# Patient Record
Sex: FEMALE | Race: WHITE | NOT HISPANIC OR LATINO | URBAN - METROPOLITAN AREA
[De-identification: names, ages, dates, MRNs, and addresses within clinical notes are randomized per-mention and may not be internally consistent; named-entity substitution may affect disease eponyms.]

---

## 2017-02-01 ENCOUNTER — OUTPATIENT (OUTPATIENT)
Dept: OUTPATIENT SERVICES | Facility: HOSPITAL | Age: 77
LOS: 1 days | Discharge: HOME | End: 2017-02-01

## 2017-02-01 DIAGNOSIS — C50.919 MALIGNANT NEOPLASM OF UNSPECIFIED SITE OF UNSPECIFIED FEMALE BREAST: ICD-10-CM

## 2017-02-03 ENCOUNTER — APPOINTMENT (OUTPATIENT)
Dept: HEMATOLOGY ONCOLOGY | Facility: CLINIC | Age: 77
End: 2017-02-03

## 2017-02-03 VITALS
DIASTOLIC BLOOD PRESSURE: 83 MMHG | WEIGHT: 209.44 LBS | TEMPERATURE: 98.7 F | BODY MASS INDEX: 35.32 KG/M2 | SYSTOLIC BLOOD PRESSURE: 151 MMHG | RESPIRATION RATE: 16 BRPM | HEART RATE: 80 BPM | OXYGEN SATURATION: 98 % | HEIGHT: 64.49 IN

## 2017-02-03 DIAGNOSIS — Z92.3 PERSONAL HISTORY OF IRRADIATION: ICD-10-CM

## 2017-02-03 DIAGNOSIS — C50.919 MALIGNANT NEOPLASM OF UNSPECIFIED SITE OF UNSPECIFIED FEMALE BREAST: ICD-10-CM

## 2017-02-03 DIAGNOSIS — Z87.42 PERSONAL HISTORY OF OTHER DISEASES OF THE FEMALE GENITAL TRACT: ICD-10-CM

## 2017-02-03 DIAGNOSIS — Z87.891 PERSONAL HISTORY OF NICOTINE DEPENDENCE: ICD-10-CM

## 2017-03-02 ENCOUNTER — FORM ENCOUNTER (OUTPATIENT)
Age: 77
End: 2017-03-02

## 2017-03-03 ENCOUNTER — OUTPATIENT (OUTPATIENT)
Dept: OUTPATIENT SERVICES | Facility: HOSPITAL | Age: 77
LOS: 1 days | End: 2017-03-03
Payer: MEDICARE

## 2017-03-03 ENCOUNTER — APPOINTMENT (OUTPATIENT)
Dept: RADIOLOGY | Facility: IMAGING CENTER | Age: 77
End: 2017-03-03

## 2017-03-03 DIAGNOSIS — Z00.8 ENCOUNTER FOR OTHER GENERAL EXAMINATION: ICD-10-CM

## 2017-03-03 PROCEDURE — 77080 DXA BONE DENSITY AXIAL: CPT

## 2019-06-25 ENCOUNTER — APPOINTMENT (OUTPATIENT)
Dept: GYNECOLOGIC ONCOLOGY | Facility: CLINIC | Age: 79
End: 2019-06-25
Payer: MEDICARE

## 2019-06-25 VITALS — DIASTOLIC BLOOD PRESSURE: 81 MMHG | SYSTOLIC BLOOD PRESSURE: 175 MMHG

## 2019-06-25 VITALS
SYSTOLIC BLOOD PRESSURE: 195 MMHG | WEIGHT: 230 LBS | BODY MASS INDEX: 39.27 KG/M2 | HEIGHT: 64 IN | DIASTOLIC BLOOD PRESSURE: 102 MMHG

## 2019-06-25 DIAGNOSIS — E66.9 OBESITY, UNSPECIFIED: ICD-10-CM

## 2019-06-25 DIAGNOSIS — Z63.4 DISAPPEARANCE AND DEATH OF FAMILY MEMBER: ICD-10-CM

## 2019-06-25 PROCEDURE — 99204 OFFICE O/P NEW MOD 45 MIN: CPT

## 2019-06-25 SDOH — SOCIAL STABILITY - SOCIAL INSECURITY: DISSAPEARANCE AND DEATH OF FAMILY MEMBER: Z63.4

## 2019-06-27 ENCOUNTER — OTHER (OUTPATIENT)
Age: 79
End: 2019-06-27

## 2019-07-01 ENCOUNTER — OUTPATIENT (OUTPATIENT)
Dept: OUTPATIENT SERVICES | Facility: HOSPITAL | Age: 79
LOS: 1 days | End: 2019-07-01
Payer: MEDICARE

## 2019-07-01 DIAGNOSIS — R93.89 ABNORMAL FINDINGS ON DIAGNOSTIC IMAGING OF OTHER SPECIFIED BODY STRUCTURES: ICD-10-CM

## 2019-07-01 DIAGNOSIS — N95.0 POSTMENOPAUSAL BLEEDING: ICD-10-CM

## 2019-07-01 DIAGNOSIS — C54.1 MALIGNANT NEOPLASM OF ENDOMETRIUM: ICD-10-CM

## 2019-07-02 ENCOUNTER — RESULT REVIEW (OUTPATIENT)
Age: 79
End: 2019-07-02

## 2019-07-02 PROCEDURE — 88321 CONSLTJ&REPRT SLD PREP ELSWR: CPT

## 2019-07-09 LAB — SURGICAL PATHOLOGY STUDY: SIGNIFICANT CHANGE UP

## 2019-07-29 ENCOUNTER — OUTPATIENT (OUTPATIENT)
Dept: OUTPATIENT SERVICES | Facility: HOSPITAL | Age: 79
LOS: 1 days | End: 2019-07-29

## 2019-07-29 VITALS
HEART RATE: 80 BPM | SYSTOLIC BLOOD PRESSURE: 158 MMHG | WEIGHT: 225.97 LBS | HEIGHT: 64 IN | DIASTOLIC BLOOD PRESSURE: 82 MMHG | RESPIRATION RATE: 14 BRPM | TEMPERATURE: 97 F | OXYGEN SATURATION: 98 %

## 2019-07-29 DIAGNOSIS — C54.1 MALIGNANT NEOPLASM OF ENDOMETRIUM: ICD-10-CM

## 2019-07-29 DIAGNOSIS — R94.31 ABNORMAL ELECTROCARDIOGRAM [ECG] [EKG]: ICD-10-CM

## 2019-07-29 LAB
ANION GAP SERPL CALC-SCNC: 13 MMO/L — SIGNIFICANT CHANGE UP (ref 7–14)
BLD GP AB SCN SERPL QL: NEGATIVE — SIGNIFICANT CHANGE UP
BUN SERPL-MCNC: 17 MG/DL — SIGNIFICANT CHANGE UP (ref 7–23)
CALCIUM SERPL-MCNC: 9.6 MG/DL — SIGNIFICANT CHANGE UP (ref 8.4–10.5)
CHLORIDE SERPL-SCNC: 105 MMOL/L — SIGNIFICANT CHANGE UP (ref 98–107)
CO2 SERPL-SCNC: 25 MMOL/L — SIGNIFICANT CHANGE UP (ref 22–31)
CREAT SERPL-MCNC: 0.66 MG/DL — SIGNIFICANT CHANGE UP (ref 0.5–1.3)
GLUCOSE SERPL-MCNC: 110 MG/DL — HIGH (ref 70–99)
HBA1C BLD-MCNC: 5.9 % — HIGH (ref 4–5.6)
HCT VFR BLD CALC: 39.4 % — SIGNIFICANT CHANGE UP (ref 34.5–45)
HGB BLD-MCNC: 12.9 G/DL — SIGNIFICANT CHANGE UP (ref 11.5–15.5)
MCHC RBC-ENTMCNC: 29.9 PG — SIGNIFICANT CHANGE UP (ref 27–34)
MCHC RBC-ENTMCNC: 32.7 % — SIGNIFICANT CHANGE UP (ref 32–36)
MCV RBC AUTO: 91.2 FL — SIGNIFICANT CHANGE UP (ref 80–100)
NRBC # FLD: 0 K/UL — SIGNIFICANT CHANGE UP (ref 0–0)
PLATELET # BLD AUTO: 238 K/UL — SIGNIFICANT CHANGE UP (ref 150–400)
PMV BLD: 9.9 FL — SIGNIFICANT CHANGE UP (ref 7–13)
POTASSIUM SERPL-MCNC: 3.9 MMOL/L — SIGNIFICANT CHANGE UP (ref 3.5–5.3)
POTASSIUM SERPL-SCNC: 3.9 MMOL/L — SIGNIFICANT CHANGE UP (ref 3.5–5.3)
RBC # BLD: 4.32 M/UL — SIGNIFICANT CHANGE UP (ref 3.8–5.2)
RBC # FLD: 13 % — SIGNIFICANT CHANGE UP (ref 10.3–14.5)
RH IG SCN BLD-IMP: POSITIVE — SIGNIFICANT CHANGE UP
SODIUM SERPL-SCNC: 143 MMOL/L — SIGNIFICANT CHANGE UP (ref 135–145)
WBC # BLD: 6.67 K/UL — SIGNIFICANT CHANGE UP (ref 3.8–10.5)
WBC # FLD AUTO: 6.67 K/UL — SIGNIFICANT CHANGE UP (ref 3.8–10.5)

## 2019-07-29 RX ORDER — SODIUM CHLORIDE 9 MG/ML
1000 INJECTION, SOLUTION INTRAVENOUS
Refills: 0 | Status: DISCONTINUED | OUTPATIENT
Start: 2019-08-08 | End: 2019-08-08

## 2019-07-29 NOTE — H&P PST ADULT - NSICDXPASTMEDICALHX_GEN_ALL_CORE_FT
PAST MEDICAL HISTORY:  2006 right breast cancer diagnosed ("stage O") --had surgery, received RT, NO chemo     endometrial "polp" in 2011     Fracture left shoulder in January 2011--no surgery     Hypercholesterolemia--takes over-the-counter medications     in 2011,  "ovarian mass"     Primary malignant neoplasm of endometrium

## 2019-07-29 NOTE — H&P PST ADULT - NEGATIVE BREAST SYMPTOMS
no breast lump L/no breast tenderness L/no breast lump R/no nipple discharge R/no breast tenderness R/no nipple discharge L

## 2019-07-29 NOTE — H&P PST ADULT - NEGATIVE CARDIOVASCULAR SYMPTOMS
no palpitations/no peripheral edema/no dyspnea on exertion/no paroxysmal nocturnal dyspnea/no chest pain

## 2019-07-29 NOTE — H&P PST ADULT - NEGATIVE ENMT SYMPTOMS
no nasal congestion/no hearing difficulty/no ear pain/no tinnitus/no vertigo/no nose bleeds/no dysphagia/no gum bleeding

## 2019-07-29 NOTE — H&P PST ADULT - NSICDXPROBLEM_GEN_ALL_CORE_FT
PROBLEM DIAGNOSES  Problem: Malignant neoplasm of endometrium  Assessment and Plan: Scheduled for Robotic Total Laparoscopic Hysterectomy, Bilateral Salpingo Oophorectomy, [place] Lymph Node Mapping and Staging on 8/8/2019.   Preop instructions given, pt verbalized understanding   GI prophylaxis and Chlorhexidine wash with instructions provided, pt verbalized understanding uisng teach back   Medical clearance requested by surgeon. Copy of MC and comparison EKG requested in PST PROBLEM DIAGNOSES  Problem: Malignant neoplasm of endometrium  Assessment and Plan: Scheduled for Robotic Total Laparoscopic Hysterectomy, Bilateral Salpingo Oophorectomy, [place] Lymph Node Mapping and Staging on 8/8/2019.   Preop instructions given, pt verbalized understanding   GI prophylaxis and Chlorhexidine wash with instructions provided, pt verbalized understanding using teach back   Medical clearance requested by surgeon. Copy of MC and comparison EKG requested in PST

## 2019-07-29 NOTE — H&P PST ADULT - RS GEN PE MLT RESP DETAILS PC
respirations non-labored/no chest wall tenderness/clear to auscultation bilaterally/breath sounds equal/good air movement/airway patent/no wheezes

## 2019-07-29 NOTE — H&P PST ADULT - HISTORY OF PRESENT ILLNESS
77 y/o female presents to PST for preoperative evaluation with dx of malignant neoplasm of endometrium. Pt presented to GYN reporting post menopausal bleeding. In office pelvic sonogram revealed thickened endometrial lining. 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 Scheduled for Robotic Total Laparoscopic Hysterectomy, Bilateral Salpingo Oophorectomy, [place] Lymph Node Mapping and Staging on 8/8/2019. 79 y/o female presents to [facility] for preoperative evaluation with dx of malignant neoplasm of endometrium. Pt presented to GYN reporting post menopausal bleeding. An in office pelvic sonogram revealed thickened endometrial lining. 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 Scheduled for Robotic Total Laparoscopic Hysterectomy, Bilateral Salpingo Oophorectomy, [place] Lymph Node Mapping and Staging on 8/8/2019.

## 2019-08-07 ENCOUNTER — TRANSCRIPTION ENCOUNTER (OUTPATIENT)
Age: 79
End: 2019-08-07

## 2019-08-08 ENCOUNTER — TRANSCRIPTION ENCOUNTER (OUTPATIENT)
Age: 79
End: 2019-08-08

## 2019-08-08 ENCOUNTER — RESULT REVIEW (OUTPATIENT)
Age: 79
End: 2019-08-08

## 2019-08-08 ENCOUNTER — APPOINTMENT (OUTPATIENT)
Dept: GYNECOLOGIC ONCOLOGY | Facility: HOSPITAL | Age: 79
End: 2019-08-08

## 2019-08-08 ENCOUNTER — INPATIENT (INPATIENT)
Facility: HOSPITAL | Age: 79
LOS: 0 days | Discharge: HOME | End: 2019-08-09
Attending: OBSTETRICS & GYNECOLOGY | Admitting: OBSTETRICS & GYNECOLOGY
Payer: MEDICARE

## 2019-08-08 VITALS
WEIGHT: 225.97 LBS | HEIGHT: 64 IN | RESPIRATION RATE: 15 BRPM | TEMPERATURE: 98 F | HEART RATE: 73 BPM | OXYGEN SATURATION: 98 % | DIASTOLIC BLOOD PRESSURE: 80 MMHG | SYSTOLIC BLOOD PRESSURE: 209 MMHG

## 2019-08-08 DIAGNOSIS — C54.1 MALIGNANT NEOPLASM OF ENDOMETRIUM: ICD-10-CM

## 2019-08-08 LAB
ANION GAP SERPL CALC-SCNC: 14 MMO/L — SIGNIFICANT CHANGE UP (ref 7–14)
BASOPHILS # BLD AUTO: 0.02 K/UL — SIGNIFICANT CHANGE UP (ref 0–0.2)
BASOPHILS NFR BLD AUTO: 0.1 % — SIGNIFICANT CHANGE UP (ref 0–2)
BUN SERPL-MCNC: 13 MG/DL — SIGNIFICANT CHANGE UP (ref 7–23)
CALCIUM SERPL-MCNC: 9.2 MG/DL — SIGNIFICANT CHANGE UP (ref 8.4–10.5)
CHLORIDE SERPL-SCNC: 100 MMOL/L — SIGNIFICANT CHANGE UP (ref 98–107)
CO2 SERPL-SCNC: 27 MMOL/L — SIGNIFICANT CHANGE UP (ref 22–31)
CREAT SERPL-MCNC: 0.63 MG/DL — SIGNIFICANT CHANGE UP (ref 0.5–1.3)
EOSINOPHIL # BLD AUTO: 0.01 K/UL — SIGNIFICANT CHANGE UP (ref 0–0.5)
EOSINOPHIL NFR BLD AUTO: 0.1 % — SIGNIFICANT CHANGE UP (ref 0–6)
GLUCOSE BLDC GLUCOMTR-MCNC: 85 MG/DL — SIGNIFICANT CHANGE UP (ref 70–99)
GLUCOSE SERPL-MCNC: 150 MG/DL — HIGH (ref 70–99)
HCT VFR BLD CALC: 41.4 % — SIGNIFICANT CHANGE UP (ref 34.5–45)
HGB BLD-MCNC: 13.4 G/DL — SIGNIFICANT CHANGE UP (ref 11.5–15.5)
IMM GRANULOCYTES NFR BLD AUTO: 0.6 % — SIGNIFICANT CHANGE UP (ref 0–1.5)
LYMPHOCYTES # BLD AUTO: 0.84 K/UL — LOW (ref 1–3.3)
LYMPHOCYTES # BLD AUTO: 5.9 % — LOW (ref 13–44)
MAGNESIUM SERPL-MCNC: 1.9 MG/DL — SIGNIFICANT CHANGE UP (ref 1.6–2.6)
MCHC RBC-ENTMCNC: 29.8 PG — SIGNIFICANT CHANGE UP (ref 27–34)
MCHC RBC-ENTMCNC: 32.4 % — SIGNIFICANT CHANGE UP (ref 32–36)
MCV RBC AUTO: 92 FL — SIGNIFICANT CHANGE UP (ref 80–100)
MONOCYTES # BLD AUTO: 0.24 K/UL — SIGNIFICANT CHANGE UP (ref 0–0.9)
MONOCYTES NFR BLD AUTO: 1.7 % — LOW (ref 2–14)
NEUTROPHILS # BLD AUTO: 12.97 K/UL — HIGH (ref 1.8–7.4)
NEUTROPHILS NFR BLD AUTO: 91.6 % — HIGH (ref 43–77)
NRBC # FLD: 0 K/UL — SIGNIFICANT CHANGE UP (ref 0–0)
PHOSPHATE SERPL-MCNC: 3.3 MG/DL — SIGNIFICANT CHANGE UP (ref 2.5–4.5)
PLATELET # BLD AUTO: 227 K/UL — SIGNIFICANT CHANGE UP (ref 150–400)
PMV BLD: 9.7 FL — SIGNIFICANT CHANGE UP (ref 7–13)
POTASSIUM SERPL-MCNC: 4.1 MMOL/L — SIGNIFICANT CHANGE UP (ref 3.5–5.3)
POTASSIUM SERPL-SCNC: 4.1 MMOL/L — SIGNIFICANT CHANGE UP (ref 3.5–5.3)
RBC # BLD: 4.5 M/UL — SIGNIFICANT CHANGE UP (ref 3.8–5.2)
RBC # FLD: 12.8 % — SIGNIFICANT CHANGE UP (ref 10.3–14.5)
SODIUM SERPL-SCNC: 141 MMOL/L — SIGNIFICANT CHANGE UP (ref 135–145)
WBC # BLD: 14.16 K/UL — HIGH (ref 3.8–10.5)
WBC # FLD AUTO: 14.16 K/UL — HIGH (ref 3.8–10.5)

## 2019-08-08 PROCEDURE — 38900 IO MAP OF SENT LYMPH NODE: CPT | Mod: 50

## 2019-08-08 PROCEDURE — 58571 TLH W/T/O 250 G OR LESS: CPT

## 2019-08-08 PROCEDURE — 88307 TISSUE EXAM BY PATHOLOGIST: CPT | Mod: 26

## 2019-08-08 PROCEDURE — 88309 TISSUE EXAM BY PATHOLOGIST: CPT | Mod: 26

## 2019-08-08 PROCEDURE — 88341 IMHCHEM/IMCYTCHM EA ADD ANTB: CPT | Mod: 26

## 2019-08-08 PROCEDURE — 88331 PATH CONSLTJ SURG 1 BLK 1SPC: CPT | Mod: 26

## 2019-08-08 PROCEDURE — 88112 CYTOPATH CELL ENHANCE TECH: CPT | Mod: 26

## 2019-08-08 PROCEDURE — 88302 TISSUE EXAM BY PATHOLOGIST: CPT | Mod: 26

## 2019-08-08 PROCEDURE — 38570 LAPAROSCOPY LYMPH NODE BIOP: CPT

## 2019-08-08 PROCEDURE — S2900 ROBOTIC SURGICAL SYSTEM: CPT | Mod: NC

## 2019-08-08 PROCEDURE — 88342 IMHCHEM/IMCYTCHM 1ST ANTB: CPT | Mod: 26

## 2019-08-08 RX ORDER — IBUPROFEN 200 MG
1 TABLET ORAL
Qty: 0 | Refills: 0 | DISCHARGE

## 2019-08-08 RX ORDER — METOPROLOL TARTRATE 50 MG
5 TABLET ORAL EVERY 6 HOURS
Refills: 0 | Status: DISCONTINUED | OUTPATIENT
Start: 2019-08-08 | End: 2019-08-08

## 2019-08-08 RX ORDER — HYDROMORPHONE HYDROCHLORIDE 2 MG/ML
0.5 INJECTION INTRAMUSCULAR; INTRAVENOUS; SUBCUTANEOUS
Refills: 0 | Status: DISCONTINUED | OUTPATIENT
Start: 2019-08-08 | End: 2019-08-09

## 2019-08-08 RX ORDER — OXYCODONE HYDROCHLORIDE 5 MG/1
10 TABLET ORAL
Refills: 0 | Status: DISCONTINUED | OUTPATIENT
Start: 2019-08-08 | End: 2019-08-09

## 2019-08-08 RX ORDER — HEPARIN SODIUM 5000 [USP'U]/ML
5000 INJECTION INTRAVENOUS; SUBCUTANEOUS EVERY 8 HOURS
Refills: 0 | Status: DISCONTINUED | OUTPATIENT
Start: 2019-08-08 | End: 2019-08-09

## 2019-08-08 RX ORDER — SODIUM CHLORIDE 9 MG/ML
1000 INJECTION, SOLUTION INTRAVENOUS
Refills: 0 | Status: DISCONTINUED | OUTPATIENT
Start: 2019-08-08 | End: 2019-08-08

## 2019-08-08 RX ORDER — OXYCODONE HYDROCHLORIDE 5 MG/1
1 TABLET ORAL
Qty: 5 | Refills: 0
Start: 2019-08-08

## 2019-08-08 RX ORDER — IBUPROFEN 200 MG
600 TABLET ORAL EVERY 6 HOURS
Refills: 0 | Status: DISCONTINUED | OUTPATIENT
Start: 2019-08-08 | End: 2019-08-09

## 2019-08-08 RX ORDER — ACETAMINOPHEN 500 MG
975 TABLET ORAL
Qty: 0 | Refills: 0 | DISCHARGE

## 2019-08-08 RX ORDER — ACETAMINOPHEN 500 MG
650 TABLET ORAL ONCE
Refills: 0 | Status: COMPLETED | OUTPATIENT
Start: 2019-08-08 | End: 2019-08-08

## 2019-08-08 RX ORDER — OXYCODONE HYDROCHLORIDE 5 MG/1
5 TABLET ORAL
Refills: 0 | Status: DISCONTINUED | OUTPATIENT
Start: 2019-08-08 | End: 2019-08-09

## 2019-08-08 RX ORDER — ACETAMINOPHEN 500 MG
975 TABLET ORAL EVERY 6 HOURS
Refills: 0 | Status: DISCONTINUED | OUTPATIENT
Start: 2019-08-08 | End: 2019-08-09

## 2019-08-08 RX ORDER — METOPROLOL TARTRATE 50 MG
5 TABLET ORAL EVERY 6 HOURS
Refills: 0 | Status: DISCONTINUED | OUTPATIENT
Start: 2019-08-08 | End: 2019-08-09

## 2019-08-08 RX ADMIN — HEPARIN SODIUM 5000 UNIT(S): 5000 INJECTION INTRAVENOUS; SUBCUTANEOUS at 21:45

## 2019-08-08 RX ADMIN — Medication 975 MILLIGRAM(S): at 18:26

## 2019-08-08 RX ADMIN — Medication 975 MILLIGRAM(S): at 19:00

## 2019-08-08 RX ADMIN — Medication 5 MILLIGRAM(S): at 21:45

## 2019-08-08 RX ADMIN — Medication 600 MILLIGRAM(S): at 21:45

## 2019-08-08 NOTE — DISCHARGE NOTE PROVIDER - HOSPITAL COURSE
Patient underwent an uncomplicated RA TLH, RS, LSO, SLNM, umbilical hernia repair for endometrial adenocarcinoma. EBL: 50. Hct 39.4->41.4. POD#0 Pain was well controlled, advanced to regular diet. Meredith was discontinued and patient voided spontaneously. No acute events overnight. Upon discharge on POD#1, the patient is ambulating, voiding spontaneously, tolerating oral intake, pain was well controlled with oral medication, and vital signs were stable. Patient to have close follow up with Dr. Arguello.

## 2019-08-08 NOTE — BRIEF OPERATIVE NOTE - OPERATION/FINDINGS
-Anteverted, mobile approx 5cm uterus. No adnexal masses appreciated on bimanual exam  -Abdominal survey demonstrated grossly normal liver edge, stomach, diaphragm, and appendix  -Grossly normal uterus with multiple, small, fibroids noted. Grossly normal tubes bilaterally. Grossly normal right ovary.

## 2019-08-08 NOTE — PROGRESS NOTE ADULT - SUBJECTIVE AND OBJECTIVE BOX
PA GYN/ONC POST OP NOTE:     Pt seen and examined earlier in PACU, was awake and alert and was restless and not happy that she was waiting too long for her to get up to her room. She was given dose of Tylenol and  for pain and feeling better. Pt denies chest pain , SOB, palpitations, nausea/vomiting. She voided and is tolerating some regular diet. Pt given dose of Lopressor 5mg IVP for elevated B/Ps, which she was refusing to take earlier.     Vital Signs Last 24 Hrs  T(C): 36.6 (08 Aug 2019 21:48), Max: 36.7 (08 Aug 2019 10:50)  T(F): 97.8 (08 Aug 2019 21:48), Max: 98 (08 Aug 2019 10:50)  HR: 94 (08 Aug 2019 21:48) (73 - 94)  BP: 159/94 (08 Aug 2019 21:48) (127/85 - 209/80)  BP(mean): 94 (08 Aug 2019 20:00) (85 - 106)  RR: 18 (08 Aug 2019 21:48) (12 - 20)  SpO2: 96% (08 Aug 2019 21:48) (90% - 100%)    U/O:    I&O's Detail    08 Aug 2019 07:01  -  08 Aug 2019 23:23  --------------------------------------------------------  IN:    lactated ringers.: 500 mL    Oral Fluid: 120 mL  Total IN: 620 mL    OUT:    Indwelling Catheter - Urethral: 400 mL    Voided: 400 mL  Total OUT: 800 mL    Total NET: -180 mL    PHYSICAL EXAM:  CHEST/LUNG: CTA B/L  HEART: S1S2 RRR  ABDOMEN: Soft, appropriate tenderness  INCISION: Scope sites C/D/I  EXTREMITIES: NT B/L, Pt has Venodynes on for DVT ppx     LABS:                        13.4   14.16 )-----------( 227      ( 08 Aug 2019 18:15 )             41.4     08-08    141  |  100  |  13  ----------------------------<  150<H>  4.1   |  27  |  0.63    Ca    9.2      08 Aug 2019 18:15  Phos  3.3     08-08  Mg     1.9     08-08    MEDICATIONS  (STANDING):  acetaminophen   Tablet .. 975 milliGRAM(s) Oral every 6 hours  heparin  Injectable 5000 Unit(s) SubCutaneous every 8 hours  ibuprofen  Tablet. 600 milliGRAM(s) Oral every 6 hours  metoprolol tartrate Injectable 5 milliGRAM(s) IV Push every 6 hours    MEDICATIONS  (PRN):  HYDROmorphone  Injectable 0.5 milliGRAM(s) IV Push every 10 minutes PRN Severe Pain (7 - 10)  oxyCODONE    IR 5 milliGRAM(s) Oral every 3 hours PRN Moderate Pain (4 - 6)  oxyCODONE    IR 10 milliGRAM(s) Oral every 3 hours PRN Severe Pain (7 - 10) PA GYN/ONC POST OP NOTE:     Pt seen and examined earlier in PACU, was awake and alert and was restless and not happy that she was waiting too long for her to get up to her room. She was given dose of Tylenol and Motrin for pain and feeling better. Pt denies chest pain , SOB, palpitations, nausea/vomiting. She voided and is tolerating some regular diet. Pt given dose of Lopressor 5mg IVP for elevated B/Ps, which she was refusing to take earlier.     Vital Signs Last 24 Hrs  T(C): 36.6 (08 Aug 2019 21:48), Max: 36.7 (08 Aug 2019 10:50)  T(F): 97.8 (08 Aug 2019 21:48), Max: 98 (08 Aug 2019 10:50)  HR: 94 (08 Aug 2019 21:48) (73 - 94)  BP: 159/94 (08 Aug 2019 21:48) (127/85 - 209/80)  BP(mean): 94 (08 Aug 2019 20:00) (85 - 106)  RR: 18 (08 Aug 2019 21:48) (12 - 20)  SpO2: 96% (08 Aug 2019 21:48) (90% - 100%)    U/O:    I&O's Detail    08 Aug 2019 07:01  -  08 Aug 2019 23:23  --------------------------------------------------------  IN:    lactated ringers.: 500 mL    Oral Fluid: 120 mL  Total IN: 620 mL    OUT:    Indwelling Catheter - Urethral: 400 mL    Voided: 400 mL  Total OUT: 800 mL    Total NET: -180 mL    PHYSICAL EXAM:  CHEST/LUNG: CTA B/L  HEART: S1S2 RRR  ABDOMEN: Soft, appropriate tenderness  INCISION: Scope sites C/D/I  EXTREMITIES: NT B/L, Pt has Venodynes on for DVT ppx     LABS:                        13.4   14.16 )-----------( 227      ( 08 Aug 2019 18:15 )             41.4     08-08    141  |  100  |  13  ----------------------------<  150<H>  4.1   |  27  |  0.63    Ca    9.2      08 Aug 2019 18:15  Phos  3.3     08-08  Mg     1.9     08-08    MEDICATIONS  (STANDING):  acetaminophen   Tablet .. 975 milliGRAM(s) Oral every 6 hours  heparin  Injectable 5000 Unit(s) SubCutaneous every 8 hours  ibuprofen  Tablet. 600 milliGRAM(s) Oral every 6 hours  metoprolol tartrate Injectable 5 milliGRAM(s) IV Push every 6 hours    MEDICATIONS  (PRN):  HYDROmorphone  Injectable 0.5 milliGRAM(s) IV Push every 10 minutes PRN Severe Pain (7 - 10)  oxyCODONE    IR 5 milliGRAM(s) Oral every 3 hours PRN Moderate Pain (4 - 6)  oxyCODONE    IR 10 milliGRAM(s) Oral every 3 hours PRN Severe Pain (7 - 10)

## 2019-08-08 NOTE — ASU PATIENT PROFILE, ADULT - NSTOBACCONEVERSMOKERY/N_GEN_A
Colonoscopy Discharge Instructions       Denver Maus  [id number]  1951      COLONOSCOPY FINDINGS:  Your colonoscopy showed: 1. One colon polyp which was completely removed. 2. Moderate diverticulosis of the colon. FOLLOW UP RECOMMENDATIONS:   Dr. Elijah Gotti will contact you with your results. Dr. Elijah Gotti will recommend your next colonoscopy after the Pathology results are available. DISCOMFORT:  If you have redness at your IV site- apply warm compress to area; if redness or soreness persist- contact your physician  There may be a slight amount of blood passed from the rectum, more than a teaspoon of bright red blood is not expected - contact your physician  Gaseous discomfort is common- walking, belching will help relieve any gas pains. If discomfort persist- contact your physician    DIET:   High fiber diet. ACTIVITY:  You may resume your normal daily activities, however, it is recommended that you spend the remainder of the day resting - avoiding any strenuous activities. You may not operate a vehicle for 24 hours  You may not engage in an occupation involving machinery or appliances for rest of today  You may not drink alcoholic beverages for at least 24 hours  Avoid making any critical decisions for at least 24 hour    CALL M.D.   ANY SIGN OF:   Increasing pain, nausea, vomiting  Abdominal distension (swelling)  New increased bleeding   Fever or chills  Pain in chest area or shortness of breath      Oh Otoole MD, FACS, FASCRS  Colon and Rectal Surgery  [facility]  Office [phone]  Fax     [phone] SUMMARY from Nurse    The following personal items are in your possession at time of discharge:    Dental Appliances: None  Visual Aid: Glasses                            PATIENT INSTRUCTIONS:    After general anesthesia or intravenous sedation, for 24 hours or while taking prescription Narcotics:  · Limit your activities  · Do not drive and operate hazardous machinery  · Do not make important personal or business decisions  · Do  not drink alcoholic beverages  · If you have not urinated within 8 hours after discharge, please contact your surgeon on call. Report the following to your surgeon:  · Excessive pain, swelling, redness or odor of or around the surgical area  · Temperature over 100.5  · Nausea and vomiting lasting longer than 4 hours or if unable to take medications  · Any signs of decreased circulation or nerve impairment to extremity: change in color, persistent  numbness, tingling, coldness or increase pain  · Any questions        What to do at Home:    These are general instructions for a healthy lifestyle:    No smoking/ No tobacco products/ Avoid exposure to second hand smoke    Surgeon General's Warning:  Quitting smoking now greatly reduces serious risk to your health. Obesity, smoking, and sedentary lifestyle greatly increases your risk for illness    A healthy diet, regular physical exercise & weight monitoring are important for maintaining a healthy lifestyle    You may be retaining fluid if you have a history of heart failure or if you experience any of the following symptoms:  Weight gain of 3 pounds or more overnight or 5 pounds in a week, increased swelling in our hands or feet or shortness of breath while lying flat in bed. Please call your doctor as soon as you notice any of these symptoms; do not wait until your next office visit. Recognize signs and symptoms of STROKE:    F-face looks uneven    A-arms unable to move or move unevenly    S-speech slurred or non-existent    T-time-call 911 as soon as signs and symptoms begin-DO NOT go       Back to bed or wait to see if you get better-TIME IS BRAIN. Warning Signs of HEART ATTACK     Call 911 if you have these symptoms:   Chest discomfort.  Most heart attacks involve discomfort in the center of the chest that lasts more than a few minutes, or that goes away and comes back. It can feel like uncomfortable pressure, squeezing, fullness, or pain.  Discomfort in other areas of the upper body. Symptoms can include pain or discomfort in one or both arms, the back, neck, jaw, or stomach.  Shortness of breath with or without chest discomfort.  Other signs may include breaking out in a cold sweat, nausea, or lightheadedness. Don't wait more than five minutes to call 911 - MINUTES MATTER! Fast action can save your life. Calling 911 is almost always the fastest way to get lifesaving treatment. Emergency Medical Services staff can begin treatment when they arrive -- up to an hour sooner than if someone gets to the hospital by car. The discharge information has been reviewed with the patient. The patient verbalized understanding. Discharge medications reviewed with the patient and appropriate educational materials and side effects teaching were provided. Patient armband removed and given to patient to take home.   Patient was informed of the privacy risks if armband lost or stolen Yes

## 2019-08-08 NOTE — DISCHARGE NOTE PROVIDER - NSDCCPTREATMENT_GEN_ALL_CORE_FT
PRINCIPAL PROCEDURE  Procedure: Hysterectomy, robot-assisted, laparoscopic, w either or both BSO and pelvic lymphadenectomy if indic  Findings and Treatment:

## 2019-08-08 NOTE — DISCHARGE NOTE PROVIDER - CARE PROVIDER_API CALL
Luz Maria Arguello)  Gynecologic Oncology; Obstetrics and Gynecology  [address] [place] [address]  Phone: [phone]  Fax: [phone]  Follow Up Time:

## 2019-08-08 NOTE — PROGRESS NOTE ADULT - ASSESSMENT
A/P: 77Y/O S/P Robotic TLH, RS, SLNM, Umbilical Hernia Repair  Plan  1. Patient encouraged to use Incentive Spirometer  2. Pain management; Tylenol and Motrin ATC, Oxycodone PRN  3. Regular diet as tolerates  4. IVL  5. Lopressor 5mg IVP Q 6 hours  6. Heparin 5000 units subcut Q 8 hours  7. Continuous Pulse ox monitoring A/P: 79Y/O S/P Robotic TLH, RSO, SLNM, Umbilical Hernia Repair  Plan  1. Patient encouraged to use Incentive Spirometer  2. Pain management; Tylenol and Motrin ATC, Oxycodone PRN  3. Regular diet as tolerates  4. IVL  5. Lopressor 5mg IVP Q 6 hours  6. Heparin 5000 units subcut Q 8 hours  7. Continuous Pulse ox monitoring

## 2019-08-08 NOTE — BRIEF OPERATIVE NOTE - NSICDXBRIEFPROCEDURE_GEN_ALL_CORE_FT
PROCEDURES:  Hysterectomy, robot-assisted, laparoscopic, w either or both BSO and pelvic lymphadenectomy if indic 08-Aug-2019 16:12:02  Charlene Flores

## 2019-08-08 NOTE — ASU PATIENT PROFILE, ADULT - PMH
2006 right breast cancer diagnosed ("stage O") --had surgery, received RT, NO chemo    endometrial "polp" in 2011    Fracture left shoulder in January 2011--no surgery    Hypercholesterolemia--takes over-the-counter medications    in 2011,  "ovarian mass"    Primary malignant neoplasm of endometrium

## 2019-08-08 NOTE — ASU PATIENT PROFILE, ADULT - PSH
Facial cosmetic surgery x 3 due to Atrophy of Skin    right breast lumpectomy 10/16/06--"stage O"--received post op RT but No chemo  right breast lumpectomy x2

## 2019-08-08 NOTE — DISCHARGE NOTE PROVIDER - NSDCCPCAREPLAN_GEN_ALL_CORE_FT
PRINCIPAL DISCHARGE DIAGNOSIS  Diagnosis: Endometrial adenocarcinoma  Assessment and Plan of Treatment:

## 2019-08-08 NOTE — BRIEF OPERATIVE NOTE - SPECIMENS
1) peritoneal washings 2) left sentinel pelvic lymph node 3) right sentinel pelvic lymph node 4) uterus, cervix, bilateral tubes and right ovary 5) umbilical hernia sac

## 2019-08-08 NOTE — DISCHARGE NOTE PROVIDER - NSDCFUADDINST_GEN_ALL_CORE_FT
Regular diet. Resume normal activity as tolerated. No heavy lifting, driving, or strenuous activity for 6 weeks. Call your doctor with any signs and symptoms of infection such as fever, chills, nausea or vomiting. Call your doctor with redness or swelling at the incision site, fluid leakage or wound separation. Call your doctor if you're unable to tolerate food or have difficulty urinating. Call your doctor if you have pain that is not relieved by your prescribed medications. Notify your doctor with any other concerns. Follow up with Dr. Arguello on 8/27.

## 2019-08-09 ENCOUNTER — TRANSCRIPTION ENCOUNTER (OUTPATIENT)
Age: 79
End: 2019-08-09

## 2019-08-09 VITALS
TEMPERATURE: 97 F | SYSTOLIC BLOOD PRESSURE: 122 MMHG | RESPIRATION RATE: 17 BRPM | HEART RATE: 72 BPM | OXYGEN SATURATION: 93 % | DIASTOLIC BLOOD PRESSURE: 57 MMHG

## 2019-08-09 DIAGNOSIS — C54.1 MALIGNANT NEOPLASM OF ENDOMETRIUM: ICD-10-CM

## 2019-08-09 PROCEDURE — 71046 X-RAY EXAM CHEST 2 VIEWS: CPT | Mod: 26

## 2019-08-09 RX ORDER — DOCUSATE SODIUM 100 MG
100 CAPSULE ORAL
Refills: 0 | Status: DISCONTINUED | OUTPATIENT
Start: 2019-08-09 | End: 2019-08-09

## 2019-08-09 RX ORDER — BENZOCAINE AND MENTHOL 5; 1 G/100ML; G/100ML
1 LIQUID ORAL EVERY 4 HOURS
Refills: 0 | Status: DISCONTINUED | OUTPATIENT
Start: 2019-08-09 | End: 2019-08-09

## 2019-08-09 RX ADMIN — Medication 975 MILLIGRAM(S): at 01:21

## 2019-08-09 RX ADMIN — HEPARIN SODIUM 5000 UNIT(S): 5000 INJECTION INTRAVENOUS; SUBCUTANEOUS at 05:32

## 2019-08-09 RX ADMIN — Medication 975 MILLIGRAM(S): at 08:53

## 2019-08-09 RX ADMIN — BENZOCAINE AND MENTHOL 1 LOZENGE: 5; 1 LIQUID ORAL at 13:23

## 2019-08-09 RX ADMIN — Medication 975 MILLIGRAM(S): at 02:20

## 2019-08-09 RX ADMIN — Medication 975 MILLIGRAM(S): at 08:23

## 2019-08-09 NOTE — DISCHARGE NOTE NURSING/CASE MANAGEMENT/SOCIAL WORK - NSDCDPATPORTLINK_GEN_ALL_CORE
You can access the [facility] Patient Portal, offered by [facility], by registering with the following website: [email or web address]

## 2019-08-09 NOTE — DISCHARGE NOTE NURSING/CASE MANAGEMENT/SOCIAL WORK - NSDCPNINST_GEN_ALL_CORE
No heavy lifting, keep arms below head. Resume regular diet. Notify provider of fever, chills, nausea/ vomiting.

## 2019-08-09 NOTE — PROGRESS NOTE ADULT - SUBJECTIVE AND OBJECTIVE BOX
R2 Gyn ONC Progress Note POD#1  HD#2    Subjective:   Pt seen and examined at bedside. Patient was given supplemental O2 as she destaurated to 90% overnight, she is not in high 90s and feeling well. Pain well controlled. Patient ambulating. Passing flatus. Tolerating regular diet. Pt denies fever, chills, chest pain, SOB, nausea, vomiting, lightheadedness, dizziness.  Has not had BM.    Objective:  T(F): 97.5 (08-09-19 @ 05:28), Max: 98 (08-08-19 @ 10:50)  HR: 64 (08-09-19 @ 05:28) (64 - 94)  BP: 114/58 (08-09-19 @ 05:28) (114/58 - 209/80)  RR: 16 (08-09-19 @ 05:28) (12 - 20)  SpO2: 98% (08-09-19 @ 05:28) (90% - 100%)  Wt(kg): --  I&O's Summary    08 Aug 2019 07:01  -  09 Aug 2019 07:00  --------------------------------------------------------  IN: 620 mL / OUT: 1000 mL / NET: -380 mL      CAPILLARY BLOOD GLUCOSE      POCT Blood Glucose.: 85 mg/dL (08 Aug 2019 11:29)      MEDICATIONS  (STANDING):  acetaminophen   Tablet .. 975 milliGRAM(s) Oral every 6 hours  heparin  Injectable 5000 Unit(s) SubCutaneous every 8 hours  ibuprofen  Tablet. 600 milliGRAM(s) Oral every 6 hours  metoprolol tartrate Injectable 5 milliGRAM(s) IV Push every 6 hours    MEDICATIONS  (PRN):  oxyCODONE    IR 5 milliGRAM(s) Oral every 3 hours PRN Moderate Pain (4 - 6)  oxyCODONE    IR 10 milliGRAM(s) Oral every 3 hours PRN Severe Pain (7 - 10)      Physical Exam:  Constitutional: NAD, A+O x3. Nasal cannula removed  CV: RR S1S2 no m/r/g  Lungs: CTA b/l, good air flow b/l  Abdomen: soft, softly-distended, appropriately-tender. No guarding, no rebound, normal bowel sounds  Incision: Port site incisions clean, dry, intact with Obsites in place  Extremities: no lower extremity edema or calf tenderness bilaterally; venodynes in place    LABS:             13.4   14.16 )-----------( 227      ( 08-08 @ 18:15 )             41.4       08-08    141    |  100    |  13     ----------------------------<  150<H>  4.1     |  27     |  0.63     Ca    9.2        08 Aug 2019 18:15  Phos  3.3       08-08  Mg     1.9       08-08 R2 Gyn ONC Progress Note POD#1  HD#2    Subjective:   Pt seen and examined at bedside. Patient was given supplemental O2 as she destaurated to 90% overnight, she is now in high 90s and feeling well. Pain well controlled. Patient ambulating. Passing flatus. Tolerating regular diet. Pt denies fever, chills, chest pain, SOB, nausea, vomiting, lightheadedness, dizziness.  Has not had BM.    Objective:  T(F): 97.5 (08-09-19 @ 05:28), Max: 98 (08-08-19 @ 10:50)  HR: 64 (08-09-19 @ 05:28) (64 - 94)  BP: 114/58 (08-09-19 @ 05:28) (114/58 - 209/80)  RR: 16 (08-09-19 @ 05:28) (12 - 20)  SpO2: 98% (08-09-19 @ 05:28) (90% - 100%)  Wt(kg): --  I&O's Summary    08 Aug 2019 07:01  -  09 Aug 2019 07:00  --------------------------------------------------------  IN: 620 mL / OUT: 1000 mL / NET: -380 mL      CAPILLARY BLOOD GLUCOSE      POCT Blood Glucose.: 85 mg/dL (08 Aug 2019 11:29)      MEDICATIONS  (STANDING):  acetaminophen   Tablet .. 975 milliGRAM(s) Oral every 6 hours  heparin  Injectable 5000 Unit(s) SubCutaneous every 8 hours  ibuprofen  Tablet. 600 milliGRAM(s) Oral every 6 hours  metoprolol tartrate Injectable 5 milliGRAM(s) IV Push every 6 hours    MEDICATIONS  (PRN):  oxyCODONE    IR 5 milliGRAM(s) Oral every 3 hours PRN Moderate Pain (4 - 6)  oxyCODONE    IR 10 milliGRAM(s) Oral every 3 hours PRN Severe Pain (7 - 10)      Physical Exam:  Constitutional: NAD, A+O x3. Nasal cannula removed  CV: RR S1S2 no m/r/g  Lungs: CTA b/l, good air flow b/l  Abdomen: soft, softly-distended, appropriately-tender. No guarding, no rebound, normal bowel sounds  Incision: Laparoscopic port incisions clean, dry, intact with opsites in place  Extremities: no lower extremity edema or calf tenderness bilaterally; venodynes in place    LABS:             13.4   14.16 )-----------( 227      ( 08-08 @ 18:15 )             41.4       08-08    141    |  100    |  13     ----------------------------<  150<H>  4.1     |  27     |  0.63     Ca    9.2        08 Aug 2019 18:15  Phos  3.3       08-08  Mg     1.9       08-08 R2 Gyn ONC Progress Note POD#1  HD#2    Subjective:   Pt seen and examined at bedside. Patient was given supplemental O2 as she destaurated to 90% overnight, she is now in high 90s and feeling well. NC removed on rounds. Pain well controlled. Patient ambulating. Passing flatus. Tolerating regular diet. Pt denies fever, chills, chest pain, SOB, nausea, vomiting, lightheadedness, dizziness.  Has not had BM.    Objective:  T(F): 97.5 (08-09-19 @ 05:28), Max: 98 (08-08-19 @ 10:50)  HR: 64 (08-09-19 @ 05:28) (64 - 94)  BP: 114/58 (08-09-19 @ 05:28) (114/58 - 209/80)  RR: 16 (08-09-19 @ 05:28) (12 - 20)  SpO2: 98% (08-09-19 @ 05:28) (90% - 100%)  Wt(kg): --  I&O's Summary    08 Aug 2019 07:01  -  09 Aug 2019 07:00  --------------------------------------------------------  IN: 620 mL / OUT: 1000 mL / NET: -380 mL      CAPILLARY BLOOD GLUCOSE      POCT Blood Glucose.: 85 mg/dL (08 Aug 2019 11:29)      MEDICATIONS  (STANDING):  acetaminophen   Tablet .. 975 milliGRAM(s) Oral every 6 hours  heparin  Injectable 5000 Unit(s) SubCutaneous every 8 hours  ibuprofen  Tablet. 600 milliGRAM(s) Oral every 6 hours  metoprolol tartrate Injectable 5 milliGRAM(s) IV Push every 6 hours    MEDICATIONS  (PRN):  oxyCODONE    IR 5 milliGRAM(s) Oral every 3 hours PRN Moderate Pain (4 - 6)  oxyCODONE    IR 10 milliGRAM(s) Oral every 3 hours PRN Severe Pain (7 - 10)      Physical Exam:  Constitutional: NAD, A+O x3. Nasal cannula removed  CV: RR S1S2 no m/r/g  Lungs: CTA b/l, good air flow b/l  Abdomen: soft, softly-distended, appropriately-tender. No guarding, no rebound, normal bowel sounds  Incision: Laparoscopic port incisions clean, dry, intact with opsites in place  Extremities: no lower extremity edema or calf tenderness bilaterally; venodynes in place    LABS:             13.4   14.16 )-----------( 227      ( 08-08 @ 18:15 )             41.4       08-08    141    |  100    |  13     ----------------------------<  150<H>  4.1     |  27     |  0.63     Ca    9.2        08 Aug 2019 18:15  Phos  3.3       08-08  Mg     1.9       08-08

## 2019-08-09 NOTE — CHART NOTE - NSCHARTNOTEFT_GEN_A_CORE
R2 Gyn Onc Chart Note    Went to bedside to see patient as O2 sat on continuous monitor was at 88%. Patient feels well and denies SOB, CP, dizziness. She just finished eating breakfast. O2 had been removed when rounding on patient prior to breakfast. Patient ambulated to bathroom to void  and denied symptoms when standing. Nasal cannula replaced and O2 sat taz to 95%.     Vital Signs Last 24 Hrs  T(C): 36.4 (09 Aug 2019 05:28), Max: 36.7 (08 Aug 2019 10:50)  T(F): 97.5 (09 Aug 2019 05:28), Max: 98 (08 Aug 2019 10:50)  HR: 64 (09 Aug 2019 05:28) (64 - 94)  BP: 114/58 (09 Aug 2019 05:28) (114/58 - 209/80)  BP(mean): 94 (08 Aug 2019 20:00) (85 - 106)  RR: 16 (09 Aug 2019 05:28) (12 - 20)  SpO2: 98% (09 Aug 2019 05:28) (90% - 100%)    Physical Exam:  Well appearing, NAD, obese  Heart: RR, S1 and S2 nl  Lungs: Fine crackles bl at bases, otherwise CTAB  Abd: Soft, NT, obsites in places c/d/i  Venodynes in place    79 yo POD#1 s/p RA TLH, RS, LSO, SLNM, umbilical hernia repair with hypoxia requiring supplemental oxygen. Asymptomatic. Likely due to atelectasis/restrictive body habitus, r/o acute pulmonary process. Patient expressed understanding and is in agreement with the plan.  -2 view chest x-ray ordered to evaluate desaturation  -patient will not be discharged until clear x-ray read and O2 saturation >92% on RA  -Patient encouraged to ambulate  -otherwise c/w plan from this AM    Patient seen with MAURO Anguiano PGY-4 and d/w DIMAS Ramos PGY-6 Gyn Onc Fellow  MINNIE Noyola PGY-2 R2 Gyn Onc Chart Note    Went to bedside to see patient as O2 sat on continuous monitor was at 88%. Patient feels well and denies SOB, CP, dizziness. She just finished eating breakfast. O2 had been removed when rounding on patient prior to breakfast. Patient ambulated to bathroom to void  and denied symptoms when standing. Nasal cannula replaced and O2 sat taz to 95%.     Vital Signs Last 24 Hrs  T(C): 36.4 (09 Aug 2019 05:28), Max: 36.7 (08 Aug 2019 10:50)  T(F): 97.5 (09 Aug 2019 05:28), Max: 98 (08 Aug 2019 10:50)  HR: 64 (09 Aug 2019 05:28) (64 - 94)  BP: 114/58 (09 Aug 2019 05:28) (114/58 - 209/80)  BP(mean): 94 (08 Aug 2019 20:00) (85 - 106)  RR: 16 (09 Aug 2019 05:28) (12 - 20)  SpO2: 98% (09 Aug 2019 05:28) (90% - 100%)    Physical Exam:  Well appearing, NAD, obese  Heart: RR, S1 and S2 nl  Lungs: Fine crackles bl at bases, otherwise CTAB  Abd: Soft, NT, obsites in places c/d/i  Venodynes in place    77 yo POD#1 s/p RA TLH, RS, LSO, SLNM, umbilical hernia repair with hypoxia requiring supplemental oxygen. Asymptomatic. Likely due to atelectasis/restrictive body habitus, r/o acute pulmonary process. Patient expressed understanding and is in agreement with the plan.  -2 view chest x-ray ordered to evaluate desaturation  -patient will not be discharged until clear x-ray read and O2 saturation >92% on RA  -Patient encouraged to ambulate, use IS  -otherwise c/w plan from this AM    Patient seen with MAURO Anguiano PGY-4 and d/w DIMAS Ramos PGY-6 Gyn Onc Fellow  MINNIE Noyola PGY-2

## 2019-08-09 NOTE — PROGRESS NOTE ADULT - PROBLEM SELECTOR PLAN 1
Neuro: Continue PO pain medication (motrin, tylenol, oxycodone). Patient has not taken oxycodone.  CV: Hemodynamically stable. H/H:12.9/39.4->13.4/41.4  Pulm: Saturating well on RA. Increase incentive spirometry. Supplemental O2 removed, will monitor saturation.  GI: C/w regular diet. Will order colace per patient request.  : Adequate UOP: 600cc/shift  Heme: Continue HSQ/Venodynes for DVT ppx. Increase OOB.    ID: Afebrile  Endo: ISS   Dispo: d/c home today    MINNIE Noyola PGY-2  [phone] Neuro: Continue PO pain medication (motrin, tylenol, oxycodone). Patient has not taken oxycodone.  CV: Hemodynamically stable. H/H:12.9/39.4->13.4/41.4  Pulm: Saturating well on RA at this time. Increase incentive spirometry. Supplemental O2 removed, will monitor saturation.  GI: C/w regular diet. Will order colace per patient request.  : Adequate UOP: 600cc/shift  Heme: Continue HSQ/Venodynes for DVT ppx. Increase OOB.    ID: Afebrile  Endo: ISS   Dispo: d/c home today    MINNIE Noyola PGY-2  [phone]

## 2019-08-09 NOTE — DISCHARGE NOTE NURSING/CASE MANAGEMENT/SOCIAL WORK - NSDCPEEMAIL_GEN_ALL_CORE
[facility] for Tobacco Control email [email or web address]

## 2019-08-09 NOTE — PROGRESS NOTE ADULT - ASSESSMENT
Assessment/Plan: 78y POD#1 s/p RA TLH, RS, LSO, SLNM, umbilical hernia repair doing well this morning. Will plan for d/c home if patient is weaned off O2. Assessment/Plan: 78y POD#1 s/p RA TLH, RS, LSO, SLNM, umbilical hernia repair for G1 endometrioid adenocarcinoma; doing well this morning. Will plan for d/c home if patient is weaned off O2.

## 2019-08-09 NOTE — PROVIDER CONTACT NOTE (OTHER) - ASSESSMENT
Patient OOB and ambulating independently. Patient was desating to 88 on room air and does not want to keep pulse ox on since she denies SOB or chest pain and does not normally require supplemental O2.

## 2019-08-09 NOTE — CHART NOTE - NSCHARTNOTEFT_GEN_A_CORE
Patient underwent Chest x-ray that revealed atelectasis. Patient ambulating the woods with no respiratory difficulties. She is ambulating and using IS. Patient had an episode of desaturation to 88% on while sleeping today but O2 increased to 93-96% while awake and seated. Admits to a history of smoking, but denies history of COPD, asthma, or other respiratory illness.    Discussed with patient the findings of chest x-ray and oxygen levels while sleeping. Counseled patient that her decrease in oxygen may be secondary to sleep apnea and that the gyn oncology team recommends an outpatient sleep study. Patient declined, stating that she never has trouble sleeping and gets out of bed without difficulty or shortness of breath. Patient requesting discharge at this time.       Vital Signs Last 24 Hrs  T(C): 36.3 (09 Aug 2019 09:35), Max: 36.6 (08 Aug 2019 21:48)  T(F): 97.3 (09 Aug 2019 09:35), Max: 97.8 (08 Aug 2019 21:48)  HR: 72 (09 Aug 2019 09:35) (64 - 94)  BP: 122/57 (09 Aug 2019 09:35) (114/58 - 172/90)  BP(mean): 94 (08 Aug 2019 20:00) (85 - 106)  RR: 17 (09 Aug 2019 09:35) (12 - 20)  SpO2: 93% (09 Aug 2019 09:35) (90% - 100%)          EXAM: XR CHEST PA LAT 2V       PROCEDURE DATE: Aug 9 2019         INTERPRETATION: EXAMINATION: XR CHEST PA AND LATERAL     CLINICAL INDICATION: Low pO2 post op     TECHNIQUE: Frontal view of the chest were obtained.     COMPARISON: None available.     IMPRESSION:     The cardiomediastinal silhouette is unremarkable.   Left mid to lower lung linear opacities representing linear atelectasis.   There is a right mid lung linear opacity also representing linear   atelectasis. Follow-up to resolution is recommended.   No pleural effusion or pneumothorax.   Degenerative changes of the thoracic spine.       a/p: Assessment/Plan: 78y POD#1 s/p RA TLH, RS, LSO, SLNM, umbilical hernia repair for G1 endometrioid adenocarcinoma; doing well this morning. Patient had desaturation in O2 saturation that may be due to atelectasis vs undiagnosed sleep apnea, with normal pulmonary exam and respiratory effort. Patient requesting discharge    - patient to be discharged home with follow up with Dr. Arguello 8/27/2019  - counseled patient to call the office if she has any concerning symptoms post op    d/w Dr. Ramos, gyn oncology fellow  KELLEN Anguiano PGY-4 Patient underwent Chest x-ray that revealed atelectasis. Patient ambulating the woods with no respiratory difficulties. She is ambulating and using IS. Patient had an episode of desaturation to 88% on while sleeping today but O2 increased to 93-96% while awake and seated. Admits to a history of smoking, but denies history of COPD, asthma, or other respiratory illness.    Discussed with patient the findings of chest x-ray and oxygen levels while sleeping. Counseled patient that her decrease in oxygen may be secondary to sleep apnea and that the gyn oncology team recommends an outpatient sleep study. Patient declined, stating that she never has trouble sleeping and gets out of bed without difficulty or shortness of breath. Patient requesting discharge at this time.       Vital Signs Last 24 Hrs  T(C): 36.3 (09 Aug 2019 09:35), Max: 36.6 (08 Aug 2019 21:48)  T(F): 97.3 (09 Aug 2019 09:35), Max: 97.8 (08 Aug 2019 21:48)  HR: 72 (09 Aug 2019 09:35) (64 - 94)  BP: 122/57 (09 Aug 2019 09:35) (114/58 - 172/90)  BP(mean): 94 (08 Aug 2019 20:00) (85 - 106)  RR: 17 (09 Aug 2019 09:35) (12 - 20)  SpO2: 93% (09 Aug 2019 09:35) (90% - 100%)          EXAM: XR CHEST PA LAT 2V       PROCEDURE DATE: Aug 9 2019         INTERPRETATION: EXAMINATION: XR CHEST PA AND LATERAL     CLINICAL INDICATION: Low pO2 post op     TECHNIQUE: Frontal view of the chest were obtained.     COMPARISON: None available.     IMPRESSION:     The cardiomediastinal silhouette is unremarkable.   Left mid to lower lung linear opacities representing linear atelectasis.   There is a right mid lung linear opacity also representing linear   atelectasis. Follow-up to resolution is recommended.   No pleural effusion or pneumothorax.   Degenerative changes of the thoracic spine.       a/p: Assessment/Plan: 78y POD#1 s/p RA TLH, RS, LSO, SLNM, umbilical hernia repair for G1 endometrioid adenocarcinoma; doing well this morning. Patient had desaturation in O2 saturation that may be due to atelectasis vs undiagnosed sleep apnea, with normal pulmonary exam and respiratory effort. Patient requesting discharge    - patient to be discharged home with follow up with Dr. Arguello 8/27/2019  - counseled patient to call the office if she has any concerning symptoms post op  - notified patient's PCP Dr. Becker of gyn oncology recommendation for sleep study outpatient    d/w Dr. Ramos, gyn oncology fellow  KELLEN Anguiano PGY-4

## 2019-08-09 NOTE — DISCHARGE NOTE NURSING/CASE MANAGEMENT/SOCIAL WORK - NSDCPEWEB_GEN_ALL_CORE
[place] website --- [email or web address] Tobacco [email or web address] website --- [email or web address]

## 2019-08-12 ENCOUNTER — INBOUND DOCUMENT (OUTPATIENT)
Age: 79
End: 2019-08-12

## 2019-08-19 LAB — SURGICAL PATHOLOGY STUDY: SIGNIFICANT CHANGE UP

## 2019-08-21 LAB — NON-GYNECOLOGICAL CYTOLOGY STUDY: SIGNIFICANT CHANGE UP

## 2019-08-27 ENCOUNTER — APPOINTMENT (OUTPATIENT)
Dept: GYNECOLOGIC ONCOLOGY | Facility: CLINIC | Age: 79
End: 2019-08-27
Payer: MEDICARE

## 2019-08-27 VITALS — HEART RATE: 91 BPM | DIASTOLIC BLOOD PRESSURE: 104 MMHG | SYSTOLIC BLOOD PRESSURE: 208 MMHG

## 2019-08-27 DIAGNOSIS — Z87.42 PERSONAL HISTORY OF OTHER DISEASES OF THE FEMALE GENITAL TRACT: ICD-10-CM

## 2019-08-27 DIAGNOSIS — R93.89 ABNORMAL FINDINGS ON DIAGNOSTIC IMAGING OF OTHER SPECIFIED BODY STRUCTURES: ICD-10-CM

## 2019-08-27 DIAGNOSIS — N85.02 ENDOMETRIAL INTRAEPITHELIAL NEOPLASIA [EIN]: ICD-10-CM

## 2019-08-27 PROBLEM — C54.1 MALIGNANT NEOPLASM OF ENDOMETRIUM: Chronic | Status: ACTIVE | Noted: 2019-07-29

## 2019-08-27 PROCEDURE — 99024 POSTOP FOLLOW-UP VISIT: CPT

## 2019-08-27 NOTE — REASON FOR VISIT
[Post Op] : post op visit [de-identified] : 8/8/19 [de-identified] : Feels well. Minimal pain (never filled narcotic script). No bleeding.  [de-identified] : RTLH, BSO, SLNMB

## 2019-08-27 NOTE — DISCUSSION/SUMMARY
[Dry] : was dry [Clean] : was clean [Intact] : was intact [None] : had no drainage [Normal Skin] : normal appearance [Tender] : nontender [Firm] : soft [External Genitalia Abnormal] : normal external genitalia [Vaginal Exam Abnormal] : normal vaginal exam [Excellent Pain Control] : has excellent pain control [No Sign of Infection] : is showing no signs of infection [Doing Well] : is doing well [de-identified] : Path: Grade 1 endometrial cancer, 25% MI, no LVSI, cervix and adnexa negative. Cytology negative. 3 negative SLN, adenomyosis involved. MSI-S.  [de-identified] : Cuff intact [Diagnosis/Stage ___] : Given this data, a diagnosis of [unfilled] is rendered. [FreeTextEntry1] : Healing well after RTLH, BSO and staging. \par Discussed ongoing recuperation. \par Reviewed final pathology results in detail. \par Meets low risk criteria with associated risk of recurrence less than 5-8% with majority of recurrences being vaginal. \par Does not meet criteria for adjuvant radiotherapy.  \par Discussed signs and symptoms of recurrent EMCA. \par Reviewed recommended surveillance plan. \julianna Carina expressed understanding of above information. \par Will return to this office in 4 months.\julianna Lim was also encouraged to see her primary care physician to address her elevated BPs noted on each visit.

## 2019-08-27 NOTE — LETTER BODY
[Dear  ___] : Dear  [unfilled], [I recently saw our patient [unfilled] for a follow-up visit.] : I recently saw our patient, [unfilled] for a follow-up visit. [FreeTextEntry1] : Pathology and Operative reports 8/8/19 [Attached please find my note.] : Attached please find my note.

## 2020-01-07 ENCOUNTER — APPOINTMENT (OUTPATIENT)
Dept: GYNECOLOGIC ONCOLOGY | Facility: CLINIC | Age: 80
End: 2020-01-07
Payer: MEDICARE

## 2020-01-07 DIAGNOSIS — Z92.21 PERSONAL HISTORY OF ANTINEOPLASTIC CHEMOTHERAPY: ICD-10-CM

## 2020-01-07 PROCEDURE — 99213 OFFICE O/P EST LOW 20 MIN: CPT

## 2020-01-28 PROBLEM — Z92.21 HISTORY OF TAMOXIFEN THERAPY: Status: ACTIVE | Noted: 2020-01-07

## 2020-01-28 NOTE — HISTORY OF PRESENT ILLNESS
[FreeTextEntry1] : 80 y/o female who presents today for her initial surveillance since her RTLH/BSO/SLNMB on 8/8/19.  \par \par Final Path:\par   Grade 1 endometrial cacner, 35% MI, no LVSI, Washings/LNs negative, MSI-S\par \par Ms. March explained to me that she believes she had dental damage during intubation from her surgery that resulted in her losing her caps and having to have teeth pulled.  She had written a letter to someone in administration and received a letter referencing a written complaint which she made.  However, has not heard back from anyone in administration.  I expressed empathy for the trouble she underwent.  \par \par Currently, she feels well.  She denies VB/VD or pelvic pain.  She denies change in bowel or bladder habits.  She all other associated signs or symptoms \par \par Mammogram- today 1/7/2020- negative\par Colonoscopy- 2015\par Colonoscopy-

## 2020-06-30 ENCOUNTER — APPOINTMENT (OUTPATIENT)
Dept: GYNECOLOGIC ONCOLOGY | Facility: CLINIC | Age: 80
End: 2020-06-30
Payer: MEDICARE

## 2020-06-30 VITALS — DIASTOLIC BLOOD PRESSURE: 126 MMHG | SYSTOLIC BLOOD PRESSURE: 196 MMHG | HEIGHT: 64 IN | HEART RATE: 91 BPM

## 2020-06-30 DIAGNOSIS — B37.2 CANDIDIASIS OF SKIN AND NAIL: ICD-10-CM

## 2020-06-30 PROCEDURE — 57100 BIOPSY VAGINAL MUCOSA SIMPLE: CPT

## 2020-06-30 PROCEDURE — 99213 OFFICE O/P EST LOW 20 MIN: CPT | Mod: 25

## 2020-06-30 RX ORDER — NYSTATIN 100000 1/G
100000 POWDER TOPICAL TWICE DAILY
Qty: 1 | Refills: 0 | Status: ACTIVE | COMMUNITY
Start: 2020-06-30 | End: 1900-01-01

## 2020-07-02 PROBLEM — B37.2 CANDIDIASIS, CUTANEOUS: Status: ACTIVE | Noted: 2020-06-30

## 2020-07-06 LAB — CORE LAB BIOPSY: NORMAL

## 2020-11-05 ENCOUNTER — APPOINTMENT (OUTPATIENT)
Dept: GYNECOLOGIC ONCOLOGY | Facility: CLINIC | Age: 80
End: 2020-11-05
Payer: MEDICARE

## 2020-11-05 VITALS
DIASTOLIC BLOOD PRESSURE: 91 MMHG | WEIGHT: 236 LBS | SYSTOLIC BLOOD PRESSURE: 188 MMHG | HEART RATE: 94 BPM | BODY MASS INDEX: 40.29 KG/M2 | HEIGHT: 64 IN

## 2020-11-05 PROCEDURE — 99213 OFFICE O/P EST LOW 20 MIN: CPT

## 2021-03-11 ENCOUNTER — APPOINTMENT (OUTPATIENT)
Dept: GYNECOLOGIC ONCOLOGY | Facility: CLINIC | Age: 81
End: 2021-03-11
Payer: MEDICARE

## 2021-03-11 DIAGNOSIS — C54.1 MALIGNANT NEOPLASM OF ENDOMETRIUM: ICD-10-CM

## 2021-03-11 PROCEDURE — 99213 OFFICE O/P EST LOW 20 MIN: CPT

## 2021-03-18 PROBLEM — C54.1 ENDOMETRIAL CANCER, GRADE I: Status: ACTIVE | Noted: 2019-06-25

## 2021-03-18 NOTE — HISTORY OF PRESENT ILLNESS
[FreeTextEntry1] : 81 y/o female who presents today for her initial surveillance since her RTLH/BSO/SLNMB on 8/8/19.  \par \par Final Path:\par   Grade 1 endometrial cancer, 35% MI, no LVSI, Washings/LNs negative, MSI-S\par \par Ms. March explained to me that she believes she had dental damage during intubation from her surgery that resulted in her losing her caps and having to have teeth pulled.  She had written a letter to someone in administration and received a letter referencing a written complaint which she made.  However, has not heard back from anyone in administration.  I expressed empathy for the trouble she underwent.  Currently, she is happy with the dental work and has no complaints.\par \par She denies any recent episode of vaginal staining.  She denies pelvic pain, change in bowel or urination.   Vaginal biopsy 6/2020- negative \par \par She received her COVID vaccine at [facility].\par \par Mammogram- today 1/7/2020- negative per patient\par Colonoscopy- 2015, she is aware to make follow up

## 2021-03-18 NOTE — PHYSICAL EXAM
[Absent] : Adnexa(ae): Absent [Normal] : Recto-Vaginal Exam: Normal [Fully active, able to carry on all pre-disease performance without restriction] : Status 0 - Fully active, able to carry on all pre-disease performance without restriction [de-identified] : no rectovaginal nodularity

## 2021-07-19 NOTE — H&P PST ADULT - GASTROINTESTINAL
[facility] has started the immunonutrition drinks given by dr gamez office.    negative detailed exam

## 2021-08-02 ENCOUNTER — APPOINTMENT (OUTPATIENT)
Dept: GYNECOLOGIC ONCOLOGY | Facility: CLINIC | Age: 81
End: 2021-08-02

## 2022-04-17 ENCOUNTER — TRANSCRIPTION ENCOUNTER (OUTPATIENT)
Age: 82
End: 2022-04-17

## 2023-09-13 NOTE — H&P PST ADULT - AIRWAY
normal Solaraze Counseling:  I discussed with the patient the risks of Solaraze including but not limited to erythema, scaling, itching, weeping, crusting, and pain.

## 2024-06-08 ENCOUNTER — NON-APPOINTMENT (OUTPATIENT)
Age: 84
End: 2024-06-08

## 2024-06-17 ENCOUNTER — APPOINTMENT (OUTPATIENT)
Dept: GERIATRICS | Facility: CLINIC | Age: 84
End: 2024-06-17

## 2024-09-25 NOTE — PATIENT PROFILE ADULT - PRO INTERPRETER NEED 2
ACUTE OCCUPATIONAL THERAPY GOALS:   (Developed with and agreed upon by patient and/or caregiver.)  1. Patient will complete full body bathing and dressing with mod I and adaptive equipment as needed.   2. Patient will complete toileting with mod I.   3. Patient will complete functional transfers with mod I and adaptive equipment as needed.   4. Patient will tolerate at least 15 minutes of OT activity with less than 2 rest breaks while maintaining O2 sats >90%.   5. Patient will verbalize at least 3 energy conservation technique to utilize during ADL/IADL.   6. Patient will complete grooming in standing at sink level with mod I.  7. Patient will complete household distances with mod I and adaptive equipment as needed.    Timeframe: 7 visits      OCCUPATIONAL THERAPY Initial Assessment, Daily Note, and PM       OT Visit Days: 1  Acknowledge Orders  Time  OT Charge Capture  Rehab Caseload Tracker      Falls risk    Wilda Camara is a 71 y.o. female   PRIMARY DIAGNOSIS: Volume overload  Volume overload [E87.70]  End-stage renal disease needing dialysis (HCC) [N18.6, Z99.2]  Hypervolemia, unspecified hypervolemia type [E87.70]       Reason for Referral: Generalized Muscle Weakness (M62.81)  Other lack of cordination (R27.8)  Difficulty in walking, Not elsewhere classified (R26.2)  Other abnormalities of gait and mobility (R26.89)  History of falling (Z91.81)  Dizziness and Giddiness (R42)  Observation: Payor: [employer] / Plan: AET MEDICARE-ADVANTAGE PPO / Product Type: Medicare /     ASSESSMENT:     REHAB RECOMMENDATIONS:   Recommendation to date pending progress:  Setting:  Short-term Rehab    Equipment:    To Be Determined  Reports has 3WRW     ASSESSMENT:  Ms. Camara is a 72 YO right hand dominant WF admitted with increased shortness of breath, increased B LE weakness and edema and diagnosed with above. Recent admission with DC 9/11, came back to ER 9/18 and then home with PNA dx. PMH includes  English